# Patient Record
Sex: MALE | Race: WHITE | NOT HISPANIC OR LATINO | URBAN - METROPOLITAN AREA
[De-identification: names, ages, dates, MRNs, and addresses within clinical notes are randomized per-mention and may not be internally consistent; named-entity substitution may affect disease eponyms.]

---

## 2021-07-21 ENCOUNTER — TRANSFERRED RECORDS (OUTPATIENT)
Dept: HEALTH INFORMATION MANAGEMENT | Facility: CLINIC | Age: 76
End: 2021-07-21

## 2021-07-22 ENCOUNTER — MEDICAL CORRESPONDENCE (OUTPATIENT)
Dept: HEALTH INFORMATION MANAGEMENT | Facility: CLINIC | Age: 76
End: 2021-07-22

## 2021-11-04 ENCOUNTER — TRANSFERRED RECORDS (OUTPATIENT)
Dept: HEALTH INFORMATION MANAGEMENT | Facility: CLINIC | Age: 76
End: 2021-11-04

## 2021-11-05 ENCOUNTER — TELEPHONE (OUTPATIENT)
Dept: OTOLARYNGOLOGY | Facility: CLINIC | Age: 76
End: 2021-11-05
Payer: COMMERCIAL

## 2021-11-05 ENCOUNTER — TRANSCRIBE ORDERS (OUTPATIENT)
Dept: OTHER | Age: 76
End: 2021-11-05

## 2021-11-05 DIAGNOSIS — G96.01 CRANIAL CEREBROSPINAL FLUID LEAK, SPONTANEOUS: Primary | ICD-10-CM

## 2021-11-05 DIAGNOSIS — H92.11 OTORRHEA, RIGHT: Primary | ICD-10-CM

## 2021-11-05 NOTE — TELEPHONE ENCOUNTER
[facility] Call Center    Phone Message    May a detailed message be left on voicemail: no     Reason for Call: Appointment Intake    Referring Provider Name: Dr. 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 Sung - CSF Otorrhea        Action Taken: Message routed to:  [facility]- [[id number]] - per protocols    Travel Screening: Not Applicable

## 2021-11-10 ENCOUNTER — TELEPHONE (OUTPATIENT)
Dept: OTOLARYNGOLOGY | Facility: CLINIC | Age: 76
End: 2021-11-10
Payer: COMMERCIAL

## 2021-11-10 NOTE — TELEPHONE ENCOUNTER
NANCI to schedule Crani visit with Pineda with [facility] prior. Gave direct number. Ask if MRI has been done. Only show patient has ever done CT scan.

## 2021-11-30 NOTE — TELEPHONE ENCOUNTER
Per Dr. Sung, we will have patient complete an updated CT temporal bones and audiogram prior to visit in [facility]. Message routed to scheduling team.

## 2021-12-02 NOTE — TELEPHONE ENCOUNTER
FUTURE VISIT INFORMATION      FUTURE VISIT INFORMATION:    Date: 1/4/2022    Time: 2:30PM    Location: [facility]  REFERRAL INFORMATION:    Referring provider:  Dr Yury Cotter     Referring providers clinic:   [facility]    Reason for visit/diagnosis  CSF leak - Referred by Yury Storey. [facility] and CT scan same day     RECORDS REQUESTED FROM:       Clinic name Comments Records Status Imaging Status    [facility] 11/4/2021, 7/21/21 note from Dr Yury Cotter  7/21/2021 audiogram  Scanned in [facility] imaging 7/21/2021 CT IAC  Scanned in [facility] PACS   Department of Otorhinolaryngology in [place], [place] 8/5/2021 note from David Lozano M.D.   Care everywhere     [facility]  7/18/2021 note from Aicha Roy MD Care everywhere

## 2021-12-12 ENCOUNTER — HEALTH MAINTENANCE LETTER (OUTPATIENT)
Age: 76
End: 2021-12-12

## 2021-12-30 DIAGNOSIS — Z01.10 ENCOUNTER FOR HEARING TEST: Primary | ICD-10-CM

## 2022-01-04 ENCOUNTER — PRE VISIT (OUTPATIENT)
Dept: OTOLARYNGOLOGY | Facility: CLINIC | Age: 77
End: 2022-01-04

## 2022-01-04 NOTE — TELEPHONE ENCOUNTER
FUTURE VISIT INFORMATION      FUTURE VISIT INFORMATION:    Date: 1/25/2022    Time: 2:30PM    Location: [facility] [place]  REFERRAL INFORMATION:    Referring provider:  Dr Yury Cotter     Referring providers clinic:   [facility]    Reason for visit/diagnosis  CSF leak - Referred by Yury Storey. [facility] and CT scan same day      RECORDS REQUESTED FROM:         Clinic name Comments Records Status Imaging Status    [facility] 11/4/2021, 7/21/21 note from Dr Yury Cotter  7/21/2021 audiogram  Scanned in [facility] imaging 7/21/2021 CT IAC  Scanned in [facility] PACS   Department of Otorhinolaryngology in [place], [place] 8/5/2021 note from David Lozano M.D.   Care everywhere      [facility]  7/18/2021 note from Aicha Roy MD Care everywhere

## 2022-01-25 ENCOUNTER — PRE VISIT (OUTPATIENT)
Dept: OTOLARYNGOLOGY | Facility: CLINIC | Age: 77
End: 2022-01-25

## 2022-10-03 ENCOUNTER — HEALTH MAINTENANCE LETTER (OUTPATIENT)
Age: 77
End: 2022-10-03

## 2023-02-11 ENCOUNTER — HEALTH MAINTENANCE LETTER (OUTPATIENT)
Age: 78
End: 2023-02-11

## 2024-03-09 ENCOUNTER — HEALTH MAINTENANCE LETTER (OUTPATIENT)
Age: 79
End: 2024-03-09